# Patient Record
Sex: FEMALE | Race: BLACK OR AFRICAN AMERICAN | NOT HISPANIC OR LATINO | Employment: STUDENT | URBAN - METROPOLITAN AREA
[De-identification: names, ages, dates, MRNs, and addresses within clinical notes are randomized per-mention and may not be internally consistent; named-entity substitution may affect disease eponyms.]

---

## 2018-04-03 ENCOUNTER — HOSPITAL ENCOUNTER (EMERGENCY)
Facility: OTHER | Age: 43
Discharge: HOME | End: 2018-04-03
Attending: EMERGENCY MEDICINE
Payer: MEDICAID

## 2018-04-03 VITALS
HEIGHT: 64 IN | DIASTOLIC BLOOD PRESSURE: 92 MMHG | OXYGEN SATURATION: 96 % | BODY MASS INDEX: 42.46 KG/M2 | SYSTOLIC BLOOD PRESSURE: 138 MMHG | WEIGHT: 248.69 LBS | HEART RATE: 87 BPM | TEMPERATURE: 98 F | RESPIRATION RATE: 18 BRPM
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
## 2018-04-03 DIAGNOSIS — M79.605 PAIN OF LEFT LOWER EXTREMITY: ICD-10-CM

## 2018-04-03 DIAGNOSIS — S39.012A BACK STRAIN, INITIAL ENCOUNTER: Primary | ICD-10-CM

## 2018-04-03 PROCEDURE — 96372 THER/PROPH/DIAG INJ SC/IM: CPT

## 2018-04-03 PROCEDURE — 63600175 PHARM REV CODE 636 W HCPCS: Performed by: PHYSICIAN ASSISTANT

## 2018-04-03 PROCEDURE — 99283 EMERGENCY DEPT VISIT LOW MDM: CPT | Mod: 25

## 2018-04-03 RX ORDER — IBUPROFEN 800 MG/1
800 TABLET ORAL EVERY 6 HOURS PRN
Qty: 20 TABLET | Refills: 0 | OUTPATIENT
Start: 2018-04-03 | End: 2019-10-08

## 2018-04-03 RX ORDER — KETOROLAC TROMETHAMINE 30 MG/ML
30 INJECTION, SOLUTION INTRAMUSCULAR; INTRAVENOUS
Status: COMPLETED | OUTPATIENT
Start: 2018-04-03 | End: 2018-04-03

## 2018-04-03 RX ORDER — CARVEDILOL 25 MG/1
25 TABLET ORAL 2 TIMES DAILY WITH MEALS
COMMUNITY

## 2018-04-03 RX ADMIN — KETOROLAC TROMETHAMINE 30 MG: 30 INJECTION, SOLUTION INTRAMUSCULAR; INTRAVENOUS at 09:04
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
## 2019-10-08 ENCOUNTER — HOSPITAL ENCOUNTER (EMERGENCY)
Facility: OTHER | Age: 44
Discharge: HOME | End: 2019-10-08
Attending: EMERGENCY MEDICINE
Payer: MEDICAID

## 2019-10-08 VITALS
TEMPERATURE: 99 F | BODY MASS INDEX: 36.96 KG/M2 | HEART RATE: 87 BPM | DIASTOLIC BLOOD PRESSURE: 104 MMHG | SYSTOLIC BLOOD PRESSURE: 158 MMHG | WEIGHT: 230 LBS | OXYGEN SATURATION: 100 % | HEIGHT: 66 IN | RESPIRATION RATE: 25 BRPM

## 2019-10-08 DIAGNOSIS — R10.13 EPIGASTRIC ABDOMINAL PAIN: Primary | ICD-10-CM

## 2019-10-08 LAB
ALBUMIN SERPL BCP-MCNC: 3.5 G/DL (ref 3.5–5.2)
ALP SERPL-CCNC: 119 U/L (ref 55–135)
ALT SERPL W/O P-5'-P-CCNC: 17 U/L (ref 10–44)
ANION GAP SERPL CALC-SCNC: 14 MMOL/L (ref 8–16)
AST SERPL-CCNC: 17 U/L (ref 10–40)
BASOPHILS # BLD AUTO: 0.04 K/UL (ref 0–0.2)
BASOPHILS NFR BLD: 0.6 % (ref 0–1.9)
BILIRUB SERPL-MCNC: 0.4 MG/DL (ref 0.1–1)
BUN SERPL-MCNC: 10 MG/DL (ref 6–20)
CALCIUM SERPL-MCNC: 9.6 MG/DL (ref 8.7–10.5)
CHLORIDE SERPL-SCNC: 104 MMOL/L (ref 95–110)
CO2 SERPL-SCNC: 22 MMOL/L (ref 23–29)
CREAT SERPL-MCNC: 0.8 MG/DL (ref 0.5–1.4)
DIFFERENTIAL METHOD: NORMAL
EOSINOPHIL # BLD AUTO: 0.1 K/UL (ref 0–0.5)
EOSINOPHIL NFR BLD: 0.9 % (ref 0–8)
ERYTHROCYTE [DISTWIDTH] IN BLOOD BY AUTOMATED COUNT: 13.5 % (ref 11.5–14.5)
EST. GFR  (AFRICAN AMERICAN): >60 ML/MIN/1.73 M^2
EST. GFR  (NON AFRICAN AMERICAN): >60 ML/MIN/1.73 M^2
GLUCOSE SERPL-MCNC: 121 MG/DL (ref 70–110)
HCT VFR BLD AUTO: 37.7 % (ref 37–48.5)
HGB BLD-MCNC: 12.1 G/DL (ref 12–16)
IMM GRANULOCYTES # BLD AUTO: 0.03 K/UL (ref 0–0.04)
IMM GRANULOCYTES NFR BLD AUTO: 0.5 % (ref 0–0.5)
LIPASE SERPL-CCNC: 8 U/L (ref 4–60)
LYMPHOCYTES # BLD AUTO: 1.6 K/UL (ref 1–4.8)
LYMPHOCYTES NFR BLD: 23.6 % (ref 18–48)
MCH RBC QN AUTO: 30 PG (ref 27–31)
MCHC RBC AUTO-ENTMCNC: 32.1 G/DL (ref 32–36)
MCV RBC AUTO: 93 FL (ref 82–98)
MONOCYTES # BLD AUTO: 0.4 K/UL (ref 0.3–1)
MONOCYTES NFR BLD: 5.9 % (ref 4–15)
NEUTROPHILS # BLD AUTO: 4.6 K/UL (ref 1.8–7.7)
NEUTROPHILS NFR BLD: 68.5 % (ref 38–73)
NRBC BLD-RTO: 0 /100 WBC
PLATELET # BLD AUTO: 242 K/UL (ref 150–350)
PMV BLD AUTO: 9.8 FL (ref 9.2–12.9)
POTASSIUM SERPL-SCNC: 3.4 MMOL/L (ref 3.5–5.1)
PROT SERPL-MCNC: 6.9 G/DL (ref 6–8.4)
RBC # BLD AUTO: 4.04 M/UL (ref 4–5.4)
SODIUM SERPL-SCNC: 140 MMOL/L (ref 136–145)
WBC # BLD AUTO: 6.64 K/UL (ref 3.9–12.7)

## 2019-10-08 PROCEDURE — 80053 COMPREHEN METABOLIC PANEL: CPT

## 2019-10-08 PROCEDURE — 96374 THER/PROPH/DIAG INJ IV PUSH: CPT

## 2019-10-08 PROCEDURE — 96375 TX/PRO/DX INJ NEW DRUG ADDON: CPT

## 2019-10-08 PROCEDURE — 85025 COMPLETE CBC W/AUTO DIFF WBC: CPT

## 2019-10-08 PROCEDURE — 36415 COLL VENOUS BLD VENIPUNCTURE: CPT

## 2019-10-08 PROCEDURE — 99284 EMERGENCY DEPT VISIT MOD MDM: CPT | Mod: 25

## 2019-10-08 PROCEDURE — 83690 ASSAY OF LIPASE: CPT

## 2019-10-08 PROCEDURE — 63600175 PHARM REV CODE 636 W HCPCS: Performed by: EMERGENCY MEDICINE

## 2019-10-08 RX ORDER — CARVEDILOL 25 MG/1
25 TABLET ORAL
COMMUNITY
Start: 2018-10-24

## 2019-10-08 RX ORDER — ONDANSETRON 4 MG/1
4 TABLET, ORALLY DISINTEGRATING ORAL EVERY 6 HOURS PRN
Qty: 20 TABLET | Refills: 0 | Status: SHIPPED | OUTPATIENT
Start: 2019-10-08

## 2019-10-08 RX ORDER — IBUPROFEN 800 MG/1
800 TABLET ORAL
COMMUNITY
Start: 2018-04-03 | End: 2020-11-28

## 2019-10-08 RX ORDER — ONDANSETRON 2 MG/ML
4 INJECTION INTRAMUSCULAR; INTRAVENOUS
Status: COMPLETED | OUTPATIENT
Start: 2019-10-08 | End: 2019-10-08

## 2019-10-08 RX ORDER — OMEPRAZOLE 20 MG/1
40 CAPSULE, DELAYED RELEASE ORAL DAILY
Qty: 60 CAPSULE | Refills: 0 | Status: SHIPPED | OUTPATIENT
Start: 2019-10-08 | End: 2020-10-07

## 2019-10-08 RX ORDER — TRAMADOL HYDROCHLORIDE 50 MG/1
50 TABLET ORAL
COMMUNITY
Start: 2018-10-24

## 2019-10-08 RX ORDER — CHLORTHALIDONE 25 MG/1
TABLET ORAL
COMMUNITY
Start: 2018-10-30

## 2019-10-08 RX ORDER — MORPHINE SULFATE 4 MG/ML
4 INJECTION, SOLUTION INTRAMUSCULAR; INTRAVENOUS
Status: COMPLETED | OUTPATIENT
Start: 2019-10-08 | End: 2019-10-08

## 2019-10-08 RX ORDER — CYCLOBENZAPRINE HCL 10 MG
10 TABLET ORAL
COMMUNITY

## 2019-10-08 RX ADMIN — MORPHINE SULFATE 4 MG: 4 INJECTION INTRAVENOUS at 10:10

## 2019-10-08 RX ADMIN — SODIUM CHLORIDE 1000 ML: 0.9 INJECTION, SOLUTION INTRAVENOUS at 10:10

## 2019-10-08 RX ADMIN — ONDANSETRON 4 MG: 2 INJECTION INTRAMUSCULAR; INTRAVENOUS at 10:10

## 2019-10-08 NOTE — ED NOTES
Two patient Identifiers for Katharina Lyon checked and correct    Pt presents with back pain that comes around to the low abdomen on left side and radiates down left leg x1wk.    LOC/Affect: Pt A&Ox4. Pt affect is calm but continuously moving as if trying to find a comfortable position.   Appearance: Pt in no acute distress at present time. Pt is clean and well groomed.  Skin: Skin warm, dry & intact. Color consistent with ethnicity. Mucous membranes moist. No breakdown or brusing noted.   Musculoskeletal: Patient ROM intact, MAEW, no obvious swelling or deformities noted.   Respiratory: Respirations spontaneous, even, and non-labored. Visible chest rise noted. Airway is open and patent. No accessory muscle use noted. Breath sounds CTAB.   Cardiac: All peripheral pulses present. No Bilateral lower extremity edema. Cap refill is <3 seconds. NSR on cardiac monitoring.  Neurologic: Pupils 3mm PERRL. Motor and sensation intact. Speech is clear and appropriate. Eyes open spontaneously, follows commands, facial expression symmetrical.  Abdomen: Abdomen soft, tender to LLQ and substernal upon palpation. No distention noted. ABS all quads.   : Pt reports no dysuria or hematuria.     Will continue to monitor

## 2019-10-08 NOTE — ED PROVIDER NOTES
"Encounter Date: 10/8/2019    SCRIBE #1 NOTE: I, Mane Vishal, am scribing for, and in the presence of, Dr. Garcia .       History     Chief Complaint   Patient presents with    Abdominal Pain     Pt c/o feeling gas in her abdomen, chest & back X 1.5 weeks. Pt states "I feel like my kidneys are swollen." Pt reports that she is out of her BP med X 2 weeks.    Back Pain     Time seen by provider: 10:02 AM    This is a 43 y.o. female who presents with complaint of abdominal pain that began approximately two weeks ago. The pain starts in her back and radiates/wraps around to her upper abdomen. She is also experiencing nausea. The patient is exacerbated with movement and deep breathing. She denies any change in her abdominal pain with eating or drinking. She denies fever, sore throat, chest pain, shortness of breath, vomiting, diarrhea, and dysuria.     The history is provided by the patient.     Review of patient's allergies indicates:  No Known Allergies  Past Medical History:   Diagnosis Date    Chronic back pain     Hypertension      Past Surgical History:   Procedure Laterality Date     SECTION      HYSTERECTOMY       No family history on file.  Social History     Tobacco Use    Smoking status: Current Every Day Smoker    Smokeless tobacco: Never Used   Substance Use Topics    Alcohol use: Yes     Comment: socially    Drug use: No     Types: Marijuana     Review of Systems   Constitutional: Negative for fever.   HENT: Negative for sore throat.    Respiratory: Negative for shortness of breath.    Cardiovascular: Negative for chest pain.   Gastrointestinal: Positive for abdominal pain (upper) and nausea.   Genitourinary: Negative for dysuria.   Musculoskeletal: Positive for back pain (that radiates to her upper abdomen).   Skin: Negative for rash.   Neurological: Negative for weakness.   Hematological: Does not bruise/bleed easily.       Physical Exam     Initial Vitals [10/08/19 0934]   BP Pulse " Resp Temp SpO2   (!) 204/118 99 18 98.6 °F (37 °C) 98 %      MAP       --         Physical Exam    Nursing note and vitals reviewed.  Constitutional: She appears well-developed and well-nourished. She is not diaphoretic. No distress.   HENT:   Head: Normocephalic and atraumatic.   Right Ear: External ear normal.   Left Ear: External ear normal.   Nose: Nose normal.   Eyes: Conjunctivae and EOM are normal. Pupils are equal, round, and reactive to light.   Neck: Normal range of motion. Neck supple. No tracheal deviation present. No JVD present.   Cardiovascular: Normal rate, regular rhythm, normal heart sounds and intact distal pulses. Exam reveals no gallop and no friction rub.    No murmur heard.  Pulmonary/Chest: Breath sounds normal. No respiratory distress. She has no wheezes. She has no rhonchi. She has no rales. She exhibits no tenderness.   Abdominal: Soft. Bowel sounds are normal. She exhibits no distension and no mass. There is tenderness (Mild) in the epigastric area. There is no rebound and no guarding.   Musculoskeletal: Normal range of motion. She exhibits no edema or tenderness.   Neurological: She is alert and oriented to person, place, and time. She has normal strength. She displays normal reflexes. No cranial nerve deficit or sensory deficit.   Skin: Skin is warm and dry. No rash noted. No erythema.   Psychiatric: She has a normal mood and affect. Her behavior is normal.         ED Course   Procedures  Labs Reviewed   COMPREHENSIVE METABOLIC PANEL - Abnormal; Notable for the following components:       Result Value    Potassium 3.4 (*)     CO2 22 (*)     Glucose 121 (*)     All other components within normal limits   CBC W/ AUTO DIFFERENTIAL   LIPASE          Imaging Results    None          Medical Decision Making:   History:   Old Medical Records: I decided to obtain old medical records.  Differential Diagnosis:   Ectopic pregnancy, threatened miscarriage, miscarriage, urinary tract infection,  Acute pyelonephritis, ovarian torsion, ovarian cyst rupture, PID, BV, TOA, , ureterolithiais, AAA rupture / dissection, diverticulitis, colitis, inflammatory bowel disease, gastroenteritis, gastritis, ulcer, cholecystitis, gallstones, pancreatitis, ileus, small bowel obstruction, appendicitis, constipation, intestinal gas pain, GERD, intestinal spasm,  intrabominal hemorrhage, intrabominal thrombus    Clinical Tests:   Lab Tests: Ordered and Reviewed  ED Management:  Patient is status post hysterectomy, so do not feel that this is a sign of an ectopic pregnancy.  She has no  symptoms, nor fever or CVA tenderness, therefore I do not believe this is UTI or acute pyelonephritis.  Her symptoms seem more consistent with an upper GI tract issue be a peptic ulcer disease, GERD or acute gastritis.  Educated the patient on dietary changes she needs to make. Patient improved with treatment in the emergency department and comfortable going home. Discussed reasons to return and importance of followup.  Patient understands that the emergency visit today is primarily to address immediate concerns and to rule out emergent cause of symptoms and that they may require further workup and evaluation as an outpatient. All questions addressed and patient given discharge instructions and followup information.               Scribe Attestation:   Scribe #1: I performed the above scribed service and the documentation accurately describes the services I performed. I attest to the accuracy of the note.    Attending Attestation:           Physician Attestation for Scribe:  Physician Attestation Statement for Scribe #1: I, Dr. Garcia, reviewed documentation, as scribed by Mane Rodgers  in my presence, and it is both accurate and complete.                 ED Course as of Oct 09 2001   Tue Oct 08, 2019   1121 Patient reports feeling better.  We discussed gastritis versus peptic ulcer disease in the need to follow up with GI.  I also  discussed with her the need to stop drinking alcohol and explained to her what withdrawal symptoms are with the caveat that if she begins to have withdrawal symptoms she needs to present to the emergency department immediately.    [MA]      ED Course User Index  [MA] Gustavo Garcia MD     Clinical Impression:     1. Epigastric abdominal pain                                 Gustavo Garcia MD  10/09/19 2003

## 2019-10-10 ENCOUNTER — HOSPITAL ENCOUNTER (EMERGENCY)
Facility: OTHER | Age: 44
Discharge: HOME | End: 2019-10-10
Attending: EMERGENCY MEDICINE
Payer: MEDICAID

## 2019-10-10 VITALS
DIASTOLIC BLOOD PRESSURE: 103 MMHG | RESPIRATION RATE: 18 BRPM | WEIGHT: 230 LBS | HEART RATE: 85 BPM | BODY MASS INDEX: 36.96 KG/M2 | SYSTOLIC BLOOD PRESSURE: 177 MMHG | HEIGHT: 66 IN | TEMPERATURE: 99 F | OXYGEN SATURATION: 97 %

## 2019-10-10 DIAGNOSIS — R03.0 ELEVATED BLOOD PRESSURE READING: Primary | ICD-10-CM

## 2019-10-10 DIAGNOSIS — K57.92 DIVERTICULITIS: ICD-10-CM

## 2019-10-10 LAB
ALBUMIN SERPL BCP-MCNC: 3.3 G/DL (ref 3.5–5.2)
ALP SERPL-CCNC: 96 U/L (ref 55–135)
ALT SERPL W/O P-5'-P-CCNC: 13 U/L (ref 10–44)
ANION GAP SERPL CALC-SCNC: 8 MMOL/L (ref 8–16)
AST SERPL-CCNC: 14 U/L (ref 10–40)
BASOPHILS # BLD AUTO: 0.04 K/UL (ref 0–0.2)
BASOPHILS NFR BLD: 0.7 % (ref 0–1.9)
BILIRUB SERPL-MCNC: 0.4 MG/DL (ref 0.1–1)
BUN SERPL-MCNC: 12 MG/DL (ref 6–20)
CALCIUM SERPL-MCNC: 9.1 MG/DL (ref 8.7–10.5)
CHLORIDE SERPL-SCNC: 106 MMOL/L (ref 95–110)
CO2 SERPL-SCNC: 27 MMOL/L (ref 23–29)
CREAT SERPL-MCNC: 0.8 MG/DL (ref 0.5–1.4)
DIFFERENTIAL METHOD: NORMAL
EOSINOPHIL # BLD AUTO: 0.1 K/UL (ref 0–0.5)
EOSINOPHIL NFR BLD: 1 % (ref 0–8)
ERYTHROCYTE [DISTWIDTH] IN BLOOD BY AUTOMATED COUNT: 13.6 % (ref 11.5–14.5)
EST. GFR  (AFRICAN AMERICAN): >60 ML/MIN/1.73 M^2
EST. GFR  (NON AFRICAN AMERICAN): >60 ML/MIN/1.73 M^2
GLUCOSE SERPL-MCNC: 89 MG/DL (ref 70–110)
HCT VFR BLD AUTO: 37.1 % (ref 37–48.5)
HGB BLD-MCNC: 12.1 G/DL (ref 12–16)
IMM GRANULOCYTES # BLD AUTO: 0.02 K/UL (ref 0–0.04)
IMM GRANULOCYTES NFR BLD AUTO: 0.3 % (ref 0–0.5)
LIPASE SERPL-CCNC: 10 U/L (ref 4–60)
LYMPHOCYTES # BLD AUTO: 1.8 K/UL (ref 1–4.8)
LYMPHOCYTES NFR BLD: 30.2 % (ref 18–48)
MCH RBC QN AUTO: 29.8 PG (ref 27–31)
MCHC RBC AUTO-ENTMCNC: 32.6 G/DL (ref 32–36)
MCV RBC AUTO: 91 FL (ref 82–98)
MONOCYTES # BLD AUTO: 0.5 K/UL (ref 0.3–1)
MONOCYTES NFR BLD: 8.3 % (ref 4–15)
NEUTROPHILS # BLD AUTO: 3.5 K/UL (ref 1.8–7.7)
NEUTROPHILS NFR BLD: 59.5 % (ref 38–73)
NRBC BLD-RTO: 0 /100 WBC
PLATELET # BLD AUTO: 225 K/UL (ref 150–350)
PMV BLD AUTO: 10.1 FL (ref 9.2–12.9)
POTASSIUM SERPL-SCNC: 3.9 MMOL/L (ref 3.5–5.1)
PROT SERPL-MCNC: 6.5 G/DL (ref 6–8.4)
RBC # BLD AUTO: 4.06 M/UL (ref 4–5.4)
SODIUM SERPL-SCNC: 141 MMOL/L (ref 136–145)
WBC # BLD AUTO: 5.93 K/UL (ref 3.9–12.7)

## 2019-10-10 PROCEDURE — 85025 COMPLETE CBC W/AUTO DIFF WBC: CPT

## 2019-10-10 PROCEDURE — 25000003 PHARM REV CODE 250: Performed by: PHYSICIAN ASSISTANT

## 2019-10-10 PROCEDURE — 96374 THER/PROPH/DIAG INJ IV PUSH: CPT

## 2019-10-10 PROCEDURE — 83690 ASSAY OF LIPASE: CPT

## 2019-10-10 PROCEDURE — 63600175 PHARM REV CODE 636 W HCPCS: Performed by: PHYSICIAN ASSISTANT

## 2019-10-10 PROCEDURE — 99285 EMERGENCY DEPT VISIT HI MDM: CPT | Mod: 25

## 2019-10-10 PROCEDURE — 96361 HYDRATE IV INFUSION ADD-ON: CPT

## 2019-10-10 PROCEDURE — 25500020 PHARM REV CODE 255: Performed by: EMERGENCY MEDICINE

## 2019-10-10 PROCEDURE — 80053 COMPREHEN METABOLIC PANEL: CPT

## 2019-10-10 RX ORDER — AMOXICILLIN AND CLAVULANATE POTASSIUM 562.5; 437.5; 62.5 MG/1; MG/1; MG/1
2 TABLET, FILM COATED, EXTENDED RELEASE ORAL EVERY 12 HOURS
Qty: 40 TABLET | Refills: 0 | Status: SHIPPED | OUTPATIENT
Start: 2019-10-10 | End: 2019-10-20

## 2019-10-10 RX ORDER — ONDANSETRON 2 MG/ML
4 INJECTION INTRAMUSCULAR; INTRAVENOUS
Status: COMPLETED | OUTPATIENT
Start: 2019-10-10 | End: 2019-10-10

## 2019-10-10 RX ORDER — DICYCLOMINE HYDROCHLORIDE 10 MG/1
20 CAPSULE ORAL
Status: COMPLETED | OUTPATIENT
Start: 2019-10-10 | End: 2019-10-10

## 2019-10-10 RX ORDER — SODIUM CHLORIDE 9 MG/ML
1000 INJECTION, SOLUTION INTRAVENOUS
Status: COMPLETED | OUTPATIENT
Start: 2019-10-10 | End: 2019-10-10

## 2019-10-10 RX ADMIN — IOHEXOL 100 ML: 350 INJECTION, SOLUTION INTRAVENOUS at 11:10

## 2019-10-10 RX ADMIN — SODIUM CHLORIDE 1000 ML: 0.9 INJECTION, SOLUTION INTRAVENOUS at 10:10

## 2019-10-10 RX ADMIN — ONDANSETRON 4 MG: 2 INJECTION INTRAMUSCULAR; INTRAVENOUS at 10:10

## 2019-10-10 RX ADMIN — DICYCLOMINE HYDROCHLORIDE 20 MG: 10 CAPSULE ORAL at 10:10

## 2019-10-10 NOTE — ED NOTES
Pt AAOx4 and appropriate at this time. Respirations even and unlabored. No acute distress noted. PA-C aware of bp of 177/103. No new orders. Discussed importance of establishing PCP so she can take oral BP medications. Pt verbalized understanding.

## 2019-10-10 NOTE — ED NOTES
Appearance: Pt awake, alert & oriented to person, place & time. Pt in no acute distress at present time. Pt is clean and well groomed with clothes appropriately fastened.   Skin: Skin warm, dry & intact. Color consistent with ethnicity. Mucous membranes moist. No breakdown or brusing noted.   Musculoskeletal: Patient moving all extremities well, no obvious swelling or deformities noted.   Respiratory: Respirations spontaneous, even, and non-labored. Visible chest rise noted. Airway is open and patent. No accessory muscle use noted.   Neurologic: Sensation is intact. Speech is clear and appropriate. Eyes open spontaneously, behavior appropriate to situation, follows commands,  purposeful motor response noted.   Cardiac:  No Bilateral lower extremity edema. Cap refill is <3 seconds. Pt denies active chest pains, SOB, dizziness, blurred vision, weakness or fatigue at this time.   Abdomen: Generalized abd pain, worse on the L side. Flank and lumbar back pain reporting.   : Pt reports no dysuria or hematuria.

## 2019-10-10 NOTE — ED NOTES
Pt c/o ABD pain and back pain x several days; was seen in ED recently for same cc; reporting pain has not gotten better. Pt hasn't taken BP medications for 2 months. Pt AAOx4 and appropriate at this time. Respirations even and unlabored. No acute distress noted.

## 2019-10-10 NOTE — ED PROVIDER NOTES
"Encounter Date: 10/10/2019       History     Chief Complaint   Patient presents with    Abdominal Pain     Pt c/o left sided abdominal pain radiating to the LLE & bilateral flank pain. Pt evaluated in the ED on Tuesday, stating "they told me I have an ulcer, but something ain't right with my body."Pt prescribed medications on Tuesday & has not picked them up from pharmacy.    Flank Pain    Emesis     Pt also c/o vomiting which started Tuesday, able to hold down water.     Patient is 43-year-old female who presents with complaints of left-sided lower abdominal pain that has been present for approximately 2 weeks prior to arrival.  She was seen here in this emergency department a few days ago and was diagnosed with likely gastritis versus peptic ulcer and was prescribed medications that she has not been able to get filled yet.  She reports that she is still feeling nauseous and still having left-sided abdominal pain but admits the pain is more lower today.  She reports feeling the need to have a bowel movement but denies any abnormal bowel movements including constipation or diarrhea.  Denies any rectal bleeding or blood in stool.  Denies any vaginal discharge.  She does report some dysuria.  She denies associated fever, chills, chest pain or shortness of breath. She is not taking her previously prescribed blood pressure medications because she has not yet established with a primary care provider.  She is currently unaccompanied in the ER.  Of note she reports that she drinks beer daily but has not had any in the last few days.  Denies any shakiness, agitation or vomiting.  She is adamant that she has never experience withdrawal symptoms and reports that she is able to stop drinking for weeks at a time without difficulty. She reports historically that she drinks 3 beers daily..        Review of patient's allergies indicates:  No Known Allergies  Past Medical History:   Diagnosis Date    Chronic back pain     " Hypertension      Past Surgical History:   Procedure Laterality Date     SECTION      HYSTERECTOMY       History reviewed. No pertinent family history.  Social History     Tobacco Use    Smoking status: Former Smoker     Types: Cigarettes    Smokeless tobacco: Never Used   Substance Use Topics    Alcohol use: Yes     Comment: every day; 2 beers    Drug use: No     Types: Marijuana     Review of Systems   Constitutional: Negative for fever.   HENT: Negative for sore throat.    Respiratory: Negative for shortness of breath.    Cardiovascular: Negative for chest pain.   Gastrointestinal: Positive for abdominal pain and nausea.   Genitourinary: Negative for dysuria.   Musculoskeletal: Negative for back pain.   Skin: Negative for rash.   Neurological: Negative for weakness.   Hematological: Does not bruise/bleed easily.       Physical Exam     Initial Vitals [10/10/19 0934]   BP Pulse Resp Temp SpO2   (!) 184/134 96 18 98.5 °F (36.9 °C) 100 %      MAP       --         Physical Exam    Nursing note and vitals reviewed.  Constitutional: She appears well-developed and well-nourished. She is not diaphoretic. No distress.   Healthy-appearing female in no acute distress but appears uncomfortable during abdominal exam.  She makes good eye contact, speaks in clear full sentences and ambulates with ease.    HENT:   Head: Normocephalic and atraumatic.   Eyes: Conjunctivae and EOM are normal. Pupils are equal, round, and reactive to light. Right eye exhibits no discharge. Left eye exhibits no discharge. No scleral icterus.   Neck: Normal range of motion.   Cardiovascular: Normal rate, regular rhythm, normal heart sounds and intact distal pulses. Exam reveals no gallop and no friction rub.    No murmur heard.  Pulmonary/Chest: Breath sounds normal. She has no wheezes. She has no rhonchi. She has no rales.   Abdominal: Bowel sounds are normal. She exhibits no mass. There is tenderness. There is guarding. There is no  rebound.   Musculoskeletal: Normal range of motion. She exhibits no edema or tenderness.   Lymphadenopathy:     She has no cervical adenopathy.   Neurological: She is alert and oriented to person, place, and time. She has normal strength. No sensory deficit. GCS score is 15. GCS eye subscore is 4. GCS verbal subscore is 5. GCS motor subscore is 6.   Skin: Skin is warm. Capillary refill takes less than 2 seconds. No rash and no abscess noted. No erythema.   Psychiatric: She has a normal mood and affect. Her behavior is normal. Thought content normal.         ED Course   Procedures  Labs Reviewed   CBC W/ AUTO DIFFERENTIAL   COMPREHENSIVE METABOLIC PANEL   LIPASE   URINALYSIS           Labs Reviewed   COMPREHENSIVE METABOLIC PANEL - Abnormal; Notable for the following components:       Result Value    Albumin 3.3 (*)     All other components within normal limits   CBC W/ AUTO DIFFERENTIAL   LIPASE     Imaging Results          CT Abdomen Pelvis With Contrast (Final result)  Result time 10/10/19 12:04:40    Final result by Jamshid Cortez MD (10/10/19 12:04:40)                 Impression:      1. Diverticulosis of the sigmoid colon with questionable early findings of acute diverticulitis as above.  2. No diverticular abscesses.  3. Status post hysterectomy and cholecystectomy.  4. CT scan of the abdomen and pelvis otherwise is unremarkable.      Electronically signed by: Jamshid Cortez MD  Date:    10/10/2019  Time:    12:04             Narrative:    EXAMINATION:  CT ABDOMEN PELVIS WITH CONTRAST    CLINICAL HISTORY:  LLQ pain, suspect diverticulitis;    TECHNIQUE:  Low dose axial images, sagittal and coronal reformations were obtained from the lung bases to the pubic symphysis following the IV administration of 100 mL of Omnipaque 350 .  No oral contrast was given.    COMPARISON:  None    FINDINGS:  Minimal ground-glass type airspace opacities noted in the medial basilar segment of the right lower lobe, likely inflammatory.  No pleural effusion.  No significant pulmonary nodules are noted. Heart size is within normal limits.    Uniform and normal enhancement of the liver.  No intrahepatic masses are noted.  There is no biliary ductal dilatation.  There are no filling defects in the portal vein or the SMV or the splenic veins.    The spleen, the pancreas and the adrenal glands demonstrate no masses.  Gallbladder is not visualized on this study.  Clinical correlation for cholecystectomy suggested.    There are symmetric bilateral nephrograms.  No renal masses or hydronephrosis or calculi is noted.  Along the course of the ureters no abnormal calcifications.  The visualized urinary bladder is within normal limits.    Uterus not visualized, likely from prior hysterectomy.  No abnormal adnexal masses.  There are few phleboliths in the pelvis.    There are a few scattered diverticula in the sigmoid colon without significant thickening of the wall of the bowel.  Questionable slight increase hazy density in the pericolonic fat (image 97 series 2 that could represent early changes of diverticulitis.  No definite signs for diverticular abscess or perforation.  In the rest of the colon also no inflammatory processes.  Appendix is visualized without definite signs for acute appendicitis.  There is no small bowel obstruction or perforation.  Within the mesentery no soft tissue masses to suggest mesenteric adenitis.    There is faint atherosclerotic changes of the abdominal aorta.  No aneurysmal dilatation.  There is no intra-abdominal lymphadenopathy.  There is no pelvic lymphadenopathy.  The ischiorectal fossa are symmetric and within normal limits.    There are spondylotic changes in the lumbar spine especially at the L5-S1 and L4-5 disc spaces.  No neoplastic processes of the lumbar spine or the pelvis.                                Medical Decision Making:   ED Management:  Urgent evaluation a 43-year-old female who presents with complaints of  left-sided lower abdominal pain most consistent with early diverticulitis.  She is afebrile, nontoxic appearing, hemodynamically stable. Physical exam outlined above and reveals left lower quadrant tenderness to deep palpation without acute peritoneal signs. Remainder of physical exam is unremarkable. She is noted to have elevated blood pressure reading as high as 184/134 at triage which improved to 170 7/103 at rest and afte symptom management.  Diagnostic labs are at baseline with improvement in potassium which was noted to be low a few days prior to this evaluation.  CT abdomen pelvis with contrast reveals signs of early diverticulitis which is likely culprit for patient's pain.  Will cover with Augmentin.  I did educate her that she still could likely be having gastritis versus GERD versus peptic ulcer issues and that she should continue to plan to take Prilosec and Zofran as previously prescribed.  She is encouraged to take antibiotic with bland diet on a full stomach a so that antibiotics do not cause worsening GI symptoms.  She is given follow-up for gastroenterologist and is educated on ED return precautions.  She verbalized understanding is amenable to plan.  She is stable for discharge.                      Clinical Impression:       ICD-10-CM ICD-9-CM   1. Elevated blood pressure reading R03.0 796.2   2. Diverticulitis K57.92 562.11                                Kenya Perez PA-C  10/10/19 [id number]

## 2020-09-25 ENCOUNTER — TELEPHONE (OUTPATIENT)
Dept: OBSTETRICS AND GYNECOLOGY | Facility: CLINIC | Age: 45
End: 2020-09-25

## 2020-09-25 NOTE — TELEPHONE ENCOUNTER
Portal request called pt to Rs appt no answer left vm for pt to call and rs appt pt stated she could not make appt today due to work.

## 2020-11-28 ENCOUNTER — HOSPITAL ENCOUNTER (EMERGENCY)
Facility: OTHER | Age: 45
Discharge: HOME | End: 2020-11-28
Attending: EMERGENCY MEDICINE
Payer: MEDICAID

## 2020-11-28 VITALS
DIASTOLIC BLOOD PRESSURE: 79 MMHG | SYSTOLIC BLOOD PRESSURE: 141 MMHG | OXYGEN SATURATION: 99 % | HEIGHT: 66 IN | WEIGHT: 225 LBS | TEMPERATURE: 99 F | RESPIRATION RATE: 17 BRPM | HEART RATE: 82 BPM | BODY MASS INDEX: 36.16 KG/M2

## 2020-11-28 DIAGNOSIS — R10.31 RIGHT LOWER QUADRANT ABDOMINAL PAIN: Primary | ICD-10-CM

## 2020-11-28 LAB
ALBUMIN SERPL BCP-MCNC: 3.7 G/DL (ref 3.5–5.2)
ALP SERPL-CCNC: 103 U/L (ref 55–135)
ALT SERPL W/O P-5'-P-CCNC: 13 U/L (ref 10–44)
ANION GAP SERPL CALC-SCNC: 9 MMOL/L (ref 8–16)
AST SERPL-CCNC: 16 U/L (ref 10–40)
BASOPHILS # BLD AUTO: 0.03 K/UL (ref 0–0.2)
BASOPHILS NFR BLD: 0.4 % (ref 0–1.9)
BILIRUB SERPL-MCNC: 0.4 MG/DL (ref 0.1–1)
BILIRUB UR QL STRIP: NEGATIVE
BUN SERPL-MCNC: 11 MG/DL (ref 6–20)
CALCIUM SERPL-MCNC: 9.2 MG/DL (ref 8.7–10.5)
CHLORIDE SERPL-SCNC: 105 MMOL/L (ref 95–110)
CLARITY UR: CLEAR
CO2 SERPL-SCNC: 25 MMOL/L (ref 23–29)
COLOR UR: YELLOW
CREAT SERPL-MCNC: 0.8 MG/DL (ref 0.5–1.4)
DIFFERENTIAL METHOD: ABNORMAL
EOSINOPHIL # BLD AUTO: 0.1 K/UL (ref 0–0.5)
EOSINOPHIL NFR BLD: 1.1 % (ref 0–8)
ERYTHROCYTE [DISTWIDTH] IN BLOOD BY AUTOMATED COUNT: 13.7 % (ref 11.5–14.5)
EST. GFR  (AFRICAN AMERICAN): >60 ML/MIN/1.73 M^2
EST. GFR  (NON AFRICAN AMERICAN): >60 ML/MIN/1.73 M^2
GLUCOSE SERPL-MCNC: 80 MG/DL (ref 70–110)
GLUCOSE UR QL STRIP: NEGATIVE
HCT VFR BLD AUTO: 36.1 % (ref 37–48.5)
HCV AB SERPL QL IA: NEGATIVE
HGB BLD-MCNC: 11.6 G/DL (ref 12–16)
HGB UR QL STRIP: NEGATIVE
HIV 1+2 AB+HIV1 P24 AG SERPL QL IA: NEGATIVE
IMM GRANULOCYTES # BLD AUTO: 0.02 K/UL (ref 0–0.04)
IMM GRANULOCYTES NFR BLD AUTO: 0.3 % (ref 0–0.5)
KETONES UR QL STRIP: ABNORMAL
LEUKOCYTE ESTERASE UR QL STRIP: NEGATIVE
LYMPHOCYTES # BLD AUTO: 2.2 K/UL (ref 1–4.8)
LYMPHOCYTES NFR BLD: 30.1 % (ref 18–48)
MCH RBC QN AUTO: 29.7 PG (ref 27–31)
MCHC RBC AUTO-ENTMCNC: 32.1 G/DL (ref 32–36)
MCV RBC AUTO: 93 FL (ref 82–98)
MONOCYTES # BLD AUTO: 0.5 K/UL (ref 0.3–1)
MONOCYTES NFR BLD: 6.4 % (ref 4–15)
NEUTROPHILS # BLD AUTO: 4.6 K/UL (ref 1.8–7.7)
NEUTROPHILS NFR BLD: 61.7 % (ref 38–73)
NITRITE UR QL STRIP: NEGATIVE
NRBC BLD-RTO: 0 /100 WBC
PH UR STRIP: 6 [PH] (ref 5–8)
PLATELET # BLD AUTO: 229 K/UL (ref 150–350)
PMV BLD AUTO: 10.1 FL (ref 9.2–12.9)
POTASSIUM SERPL-SCNC: 3.8 MMOL/L (ref 3.5–5.1)
PROT SERPL-MCNC: 7 G/DL (ref 6–8.4)
PROT UR QL STRIP: NEGATIVE
RBC # BLD AUTO: 3.9 M/UL (ref 4–5.4)
SODIUM SERPL-SCNC: 139 MMOL/L (ref 136–145)
SP GR UR STRIP: 1.02 (ref 1–1.03)
URN SPEC COLLECT METH UR: ABNORMAL
UROBILINOGEN UR STRIP-ACNC: NEGATIVE EU/DL
WBC # BLD AUTO: 7.38 K/UL (ref 3.9–12.7)

## 2020-11-28 PROCEDURE — 99285 EMERGENCY DEPT VISIT HI MDM: CPT | Mod: 25

## 2020-11-28 PROCEDURE — 81003 URINALYSIS AUTO W/O SCOPE: CPT

## 2020-11-28 PROCEDURE — 80053 COMPREHEN METABOLIC PANEL: CPT

## 2020-11-28 PROCEDURE — 25000003 PHARM REV CODE 250: Performed by: EMERGENCY MEDICINE

## 2020-11-28 PROCEDURE — 96361 HYDRATE IV INFUSION ADD-ON: CPT

## 2020-11-28 PROCEDURE — 86703 HIV-1/HIV-2 1 RESULT ANTBDY: CPT

## 2020-11-28 PROCEDURE — 86803 HEPATITIS C AB TEST: CPT

## 2020-11-28 PROCEDURE — 63600175 PHARM REV CODE 636 W HCPCS: Performed by: EMERGENCY MEDICINE

## 2020-11-28 PROCEDURE — 96374 THER/PROPH/DIAG INJ IV PUSH: CPT

## 2020-11-28 PROCEDURE — 25500020 PHARM REV CODE 255: Performed by: EMERGENCY MEDICINE

## 2020-11-28 PROCEDURE — 85025 COMPLETE CBC W/AUTO DIFF WBC: CPT

## 2020-11-28 RX ORDER — HYOSCYAMINE SULFATE 0.125 MG
125 TABLET ORAL EVERY 4 HOURS PRN
Qty: 12 TABLET | Refills: 0 | Status: SHIPPED | OUTPATIENT
Start: 2020-11-28 | End: 2020-12-28

## 2020-11-28 RX ORDER — HYDROMORPHONE HYDROCHLORIDE 1 MG/ML
0.5 INJECTION, SOLUTION INTRAMUSCULAR; INTRAVENOUS; SUBCUTANEOUS
Status: COMPLETED | OUTPATIENT
Start: 2020-11-28 | End: 2020-11-28

## 2020-11-28 RX ORDER — IBUPROFEN 600 MG/1
600 TABLET ORAL EVERY 6 HOURS PRN
Qty: 20 TABLET | Refills: 0 | OUTPATIENT
Start: 2020-11-28 | End: 2023-01-19

## 2020-11-28 RX ADMIN — SODIUM CHLORIDE 1000 ML: 0.9 INJECTION, SOLUTION INTRAVENOUS at 11:11

## 2020-11-28 RX ADMIN — HYDROMORPHONE HYDROCHLORIDE 0.5 MG: 1 INJECTION, SOLUTION INTRAMUSCULAR; INTRAVENOUS; SUBCUTANEOUS at 11:11

## 2020-11-28 RX ADMIN — IOHEXOL 100 ML: 350 INJECTION, SOLUTION INTRAVENOUS at 12:11

## 2020-11-28 NOTE — ED NOTES
Pt to ED with c/o intermittent RLQ pain x 1 month. Pt reports pain worse last PM. Pt reports pain radiating to R side of lower back and down R leg. Pt reports pain with urination. Pt denies vaginal discharge or bleeding. Pt denies blood in urine. Pt reports nausea x last PM. Pt denies CP, SOB, v/d, fever, chills. AAOX4. RR even, unlabored. Pt attached to pulse ox and BP cycled. Will continue to monitor.

## 2020-11-28 NOTE — ED NOTES
Patient rounding complete. Comfort and positioning addressed. Toileting needs addressed. Pain (8/10). Pt remains attached to pulse ox, cardiac monitor, BP cycled. Bed rails up x2, bed locked and at lowest position, call remains at beside within reach of patient, instructed on use. AAOx4. RR even and unlabored. Pt updated on plan of care. Pt verbalized understanding. Will continue to monitor.

## 2020-11-28 NOTE — ED NOTES
Patient rounding complete. Comfort and positioning addressed. Toileting needs addressed. Pain (4/10). Pt remains attached to pulse ox, cardiac monitor, BP cycled. Bed rails up x2, bed locked and at lowest position, call remains at beside within reach of patient, instructed on use. AAOx4. RR even and unlabored. Pt updated on plan of care. Pt verbalized understanding. Will continue to monitor.

## 2020-11-28 NOTE — ED PROVIDER NOTES
"Encounter Date: 2020    SCRIBE #1 NOTE: I, Carolyn Brown, am scribing for, and in the presence of, Dr. Clemons.       History     Chief Complaint   Patient presents with    Abdominal Pain     +Intermittent RLQ pain with radiation into R back and dysuria x1 month, worsened last PM with nausea. Denies fever, chills, hematuria.       Time seen by provider: 11:10 AM    This is a 44 y.o. female with hx of HTN who presents with complaint of abdominal pain. Patient reports that she has had chronic abdominal pain for the last 24 years since having a , which has become more intense and frequent over the last month and especially worse yesterday. The pain was rated 10/10 last night, but is now rated 8/10. She describes the pain as "cramping," which radiates from the RLQ to her right groin. The pain worsens when her bladder is full and when urinating. There are no alleviating factors. The pain is unchanged with eating meals, drinking, or bowel movements. She reports chills, nausea, and urinary frequency. She denies fever, vomiting, diarrhea, constipation, dysuria, or hematuria. PSHx includes hysterectomy and  section. She has been told in the past that her chronic pain may be due to scar tissue. NKDA. She reports use of tobacco and alcohol. This is the extent of the patient's complaints at this time.    The history is provided by the patient.     Review of patient's allergies indicates:  No Known Allergies  Past Medical History:   Diagnosis Date    Chronic back pain     Hypertension      Past Surgical History:   Procedure Laterality Date     SECTION      HYSTERECTOMY       History reviewed. No pertinent family history.  Social History     Tobacco Use    Smoking status: Former Smoker     Types: Cigarettes    Smokeless tobacco: Never Used   Substance Use Topics    Alcohol use: Yes     Comment: every day; 2 beers    Drug use: No     Types: Marijuana     Review of Systems   Constitutional: " Positive for chills. Negative for fever.   HENT: Negative for congestion, rhinorrhea and sore throat.    Eyes: Negative for visual disturbance.   Respiratory: Negative for cough and shortness of breath.    Cardiovascular: Negative for chest pain and palpitations.   Gastrointestinal: Positive for abdominal pain and nausea. Negative for constipation, diarrhea and vomiting.   Genitourinary: Positive for frequency. Negative for decreased urine volume, dysuria, hematuria and vaginal discharge.   Musculoskeletal: Negative for joint swelling, neck pain and neck stiffness.   Skin: Negative for rash and wound.   Neurological: Negative for weakness, numbness and headaches.   Psychiatric/Behavioral: Negative for confusion.       Physical Exam     Initial Vitals [11/28/20 0944]   BP Pulse Resp Temp SpO2   (!) 158/97 103 18 98.8 °F (37.1 °C) 100 %      MAP       --         Physical Exam    Nursing note and vitals reviewed.  Constitutional: She appears well-developed and well-nourished. She is not diaphoretic. No distress.   HENT:   Head: Normocephalic and atraumatic.   Eyes: Conjunctivae and EOM are normal. Pupils are equal, round, and reactive to light. No scleral icterus.   Neck: Normal range of motion. Neck supple.   Cardiovascular: Normal rate, regular rhythm and normal heart sounds. Exam reveals no gallop and no friction rub.    No murmur heard.  Pulmonary/Chest: Breath sounds normal. No respiratory distress. She has no wheezes. She has no rhonchi. She has no rales.   Abdominal: Soft. Bowel sounds are normal. She exhibits no distension. There is abdominal tenderness in the right lower quadrant, suprapubic area and left lower quadrant. There is guarding. There is no rebound.   Musculoskeletal: Normal range of motion. No tenderness or edema.   Neurological: She is alert and oriented to person, place, and time.   Skin: Skin is warm and dry.   Psychiatric: She has a normal mood and affect. Her behavior is normal. Judgment and  thought content normal.         ED Course   Procedures  Labs Reviewed   URINALYSIS, REFLEX TO URINE CULTURE - Abnormal; Notable for the following components:       Result Value    Ketones, UA Trace (*)     All other components within normal limits    Narrative:     Specimen Source->Urine   CBC W/ AUTO DIFFERENTIAL - Abnormal; Notable for the following components:    RBC 3.90 (*)     Hemoglobin 11.6 (*)     Hematocrit 36.1 (*)     All other components within normal limits   HIV 1 / 2 ANTIBODY   HEPATITIS C ANTIBODY   COMPREHENSIVE METABOLIC PANEL          Imaging Results          CT Abdomen Pelvis With Contrast / no oral contrast (Final result)  Result time 11/28/20 12:58:04    Final result by Christian Galvez MD (11/28/20 12:58:04)                 Impression:      No CT evidence of appendicitis or concerning inflammatory process.      Electronically signed by: Christian Galvez MD  Date:    11/28/2020  Time:    12:58             Narrative:    EXAMINATION:  CT ABDOMEN PELVIS WITH CONTRAST    CLINICAL HISTORY:  RLQ abdominal pain, appendicitis suspected (Age => 12y);    TECHNIQUE:  Low dose axial images, sagittal and coronal reformations were obtained from the lung bases to the pubic symphysis following the IV administration of 100 mL of Omnipaque 350 .  Oral contrast was not given.    COMPARISON:  CT 10/10/2019    FINDINGS:  Visualized lower chest is unremarkable.    Within the abdomen, the liver and spleen are unchanged.  Pancreas is normal. Adrenal glands are unremarkable. Gallbladder normal. No biliary dilatation.    Kidneys are unremarkable.    Stomach is normal. Small bowel is nonobstructive. Colon demonstrates no acute abnormality.  No CT evidence of appendicitis.    No free fluid or adenopathy present.    Within the pelvis, urinary bladder is unremarkable. No pelvic mass or adenopathy present. No free fluid.  Hysterectomy findings.    Osseous structures demonstrate no acute abnormality with degenerative  findings.                                 Medical Decision Making:   History:   Old Medical Records: I decided to obtain old medical records.  Clinical Tests:   Lab Tests: Ordered and Reviewed  Radiological Study: Ordered and Reviewed  ED Management:  Emergent evaluation of 44-year-old female who presents with complaint of acute on chronic lower abdominal pain.  Patient reports history of chronic pain for decades, which is been worse for the last few days with some associated urinary frequency.  Vital signs are benign, afebrile.  On examination she is tender in bilateral lower quadrant with guarding.  CT abdomen pelvis performed as I was concerned for possible obstruction, colitis, appendicitis- it is normal with no concerning findings or significant inflammation.  Urinalysis is also clear with no evidence of UTI and no hematuria to suggest renal colic.  Patient was treated with analgesics and IV fluids with improvement.  On reassessment, pain had improved she was no longer tender to palpation and she was in agreement with discharge home.  She stated she was feeling much better and will follow-up with her PCP.  She is also given strict return precautions.  She was discharged in improved condition.            Scribe Attestation:   Scribe #1: I performed the above scribed service and the documentation accurately describes the services I performed. I attest to the accuracy of the note.    Attending Attestation:           Physician Attestation for Scribe:  Physician Attestation Statement for Scribe #1: I, Dr. Clemons, reviewed documentation, as scribed by Carolyn Brown in my presence, and it is both accurate and complete.                           Clinical Impression:     ICD-10-CM ICD-9-CM   1. Right lower quadrant abdominal pain  R10.31 789.03                          ED Disposition Condition    Discharge Stable        ED Prescriptions     Medication Sig Dispense Start Date End Date Auth. Provider    ibuprofen  (ADVIL,MOTRIN) 600 MG tablet Take 1 tablet (600 mg total) by mouth every 6 (six) hours as needed for Pain. 20 tablet 11/28/2020  Rose Clemons MD    hyoscyamine (ANASPAZ,LEVSIN) 0.125 mg Tab Take 1 tablet (125 mcg total) by mouth every 4 (four) hours as needed (cramping). 12 tablet 11/28/2020 12/28/2020 Rose Clemons MD        Follow-up Information     Follow up With Specialties Details Why Contact Info    [facility] Schedule an appointment as soon as possible for a visit   [address] [address]  [phone]      [facility]  As needed, If symptoms worsen [address] [facility] [id number] [phone]                                       Rose Clemons MD  11/28/20 [id number]

## 2020-11-28 NOTE — ED NOTES
Patient rounding complete. Comfort and positioning addressed. Toileting needs addressed. Pain (3/10). Pt remains attached to pulse ox, cardiac monitor, BP cycled. Bed rails up x2, bed locked and at lowest position, call remains at beside within reach of patient, instructed on use. AAOx4. RR even and unlabored. Pt updated on plan of care. Pt verbalized understanding. Will continue to monitor.

## 2021-03-12 ENCOUNTER — HOSPITAL ENCOUNTER (EMERGENCY)
Facility: HOSPITAL | Age: 46
Discharge: HOME | End: 2021-03-12
Attending: EMERGENCY MEDICINE
Payer: MEDICAID

## 2021-03-12 VITALS
BODY MASS INDEX: 36.16 KG/M2 | WEIGHT: 225 LBS | SYSTOLIC BLOOD PRESSURE: 161 MMHG | DIASTOLIC BLOOD PRESSURE: 114 MMHG | HEIGHT: 66 IN | HEART RATE: 83 BPM | OXYGEN SATURATION: 99 % | TEMPERATURE: 99 F | RESPIRATION RATE: 18 BRPM

## 2021-03-12 DIAGNOSIS — S63.92XA HAND SPRAIN, LEFT, INITIAL ENCOUNTER: ICD-10-CM

## 2021-03-12 DIAGNOSIS — T14.90XA BLUNT TRAUMA: ICD-10-CM

## 2021-03-12 DIAGNOSIS — S63.502A FOREARM SPRAIN, LEFT, INITIAL ENCOUNTER: ICD-10-CM
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
## 2021-03-12 DIAGNOSIS — I10 UNCONTROLLED HYPERTENSION: ICD-10-CM

## 2021-03-12 PROCEDURE — 99284 EMERGENCY DEPT VISIT MOD MDM: CPT | Mod: 25,ER

## 2021-03-12 PROCEDURE — 25000003 PHARM REV CODE 250: Mod: ER | Performed by: NURSE PRACTITIONER

## 2021-03-12 RX ORDER — CLONIDINE HYDROCHLORIDE 0.1 MG/1
0.1 TABLET ORAL
Status: COMPLETED | OUTPATIENT
Start: 2021-03-12 | End: 2021-03-12

## 2021-03-12 RX ORDER — DICLOFENAC SODIUM 50 MG/1
50 TABLET, DELAYED RELEASE ORAL 3 TIMES DAILY PRN
Qty: 30 TABLET | Refills: 0 | Status: SHIPPED | OUTPATIENT
Start: 2021-03-12

## 2021-03-12 RX ORDER — KETOROLAC TROMETHAMINE 10 MG/1
10 TABLET, FILM COATED ORAL
Status: COMPLETED | OUTPATIENT
Start: 2021-03-12 | End: 2021-03-12

## 2021-03-12 RX ORDER — METHOCARBAMOL 500 MG/1
1000 TABLET, FILM COATED ORAL EVERY 6 HOURS PRN
Qty: 40 TABLET | Refills: 0 | OUTPATIENT
Start: 2021-03-12 | End: 2023-01-19

## 2021-03-12 RX ADMIN — CLONIDINE HYDROCHLORIDE 0.1 MG: 0.1 TABLET ORAL at 11:03

## 2021-03-12 RX ADMIN — KETOROLAC TROMETHAMINE 10 MG: 10 TABLET, FILM COATED ORAL at 11:03

## 2021-03-30 ENCOUNTER — IMMUNIZATION (OUTPATIENT)
Dept: PRIMARY CARE CLINIC | Facility: CLINIC | Age: 46
End: 2021-03-30
Payer: MEDICAID

## 2021-03-30 DIAGNOSIS — Z23 NEED FOR VACCINATION: Primary | ICD-10-CM

## 2021-03-30 PROCEDURE — 0011A PR IMMUNIZ ADMIN, SARS-COV-2 COVID-19 VACC, 100MCG/0.5ML, 1ST DOSE: ICD-10-PCS | Mod: CV19,S$GLB,, | Performed by: INTERNAL MEDICINE

## 2021-03-30 PROCEDURE — 91301 PR SARS-COV-2 COVID-19 VACCINE, NO PRSV, 100MCG/0.5ML, IM: ICD-10-PCS | Mod: S$GLB,,, | Performed by: INTERNAL MEDICINE

## 2021-03-30 PROCEDURE — 91301 PR SARS-COV-2 COVID-19 VACCINE, NO PRSV, 100MCG/0.5ML, IM: CPT | Mod: S$GLB,,, | Performed by: INTERNAL MEDICINE

## 2021-03-30 PROCEDURE — 0011A PR IMMUNIZ ADMIN, SARS-COV-2 COVID-19 VACC, 100MCG/0.5ML, 1ST DOSE: CPT | Mod: CV19,S$GLB,, | Performed by: INTERNAL MEDICINE

## 2021-03-30 RX ADMIN — Medication 0.5 ML: at 10:03

## 2021-04-28 ENCOUNTER — IMMUNIZATION (OUTPATIENT)
Dept: PRIMARY CARE CLINIC | Facility: CLINIC | Age: 46
End: 2021-04-28
Payer: MEDICAID

## 2021-04-28 DIAGNOSIS — Z23 NEED FOR VACCINATION: Primary | ICD-10-CM

## 2021-04-28 PROCEDURE — 91301 PR SARS-COV-2 COVID-19 VACCINE, NO PRSV, 100MCG/0.5ML, IM: ICD-10-PCS | Mod: S$GLB,,, | Performed by: INTERNAL MEDICINE

## 2021-04-28 PROCEDURE — 0012A PR IMMUNIZ ADMIN, SARS-COV-2 COVID-19 VACC, 100MCG/0.5ML, 2ND DOSE: CPT | Mod: CV19,S$GLB,, | Performed by: INTERNAL MEDICINE

## 2021-04-28 PROCEDURE — 0012A PR IMMUNIZ ADMIN, SARS-COV-2 COVID-19 VACC, 100MCG/0.5ML, 2ND DOSE: ICD-10-PCS | Mod: CV19,S$GLB,, | Performed by: INTERNAL MEDICINE

## 2021-04-28 PROCEDURE — 91301 PR SARS-COV-2 COVID-19 VACCINE, NO PRSV, 100MCG/0.5ML, IM: CPT | Mod: S$GLB,,, | Performed by: INTERNAL MEDICINE

## 2021-04-28 RX ADMIN — Medication 0.5 ML: at 09:04

## 2023-01-19 ENCOUNTER — HOSPITAL ENCOUNTER (EMERGENCY)
Facility: OTHER | Age: 48
Discharge: HOME | End: 2023-01-19
Attending: EMERGENCY MEDICINE
Payer: MEDICAID

## 2023-01-19 VITALS
RESPIRATION RATE: 18 BRPM | SYSTOLIC BLOOD PRESSURE: 153 MMHG | WEIGHT: 230 LBS | TEMPERATURE: 98 F | HEIGHT: 65 IN | OXYGEN SATURATION: 98 % | HEART RATE: 76 BPM | BODY MASS INDEX: 38.32 KG/M2 | DIASTOLIC BLOOD PRESSURE: 101 MMHG

## 2023-01-19 DIAGNOSIS — M54.42 CHRONIC BILATERAL LOW BACK PAIN WITH LEFT-SIDED SCIATICA: Primary | ICD-10-CM

## 2023-01-19 DIAGNOSIS — M25.562 LEFT KNEE PAIN: ICD-10-CM

## 2023-01-19 DIAGNOSIS — M17.12 OSTEOARTHRITIS OF LEFT KNEE, UNSPECIFIED OSTEOARTHRITIS TYPE: ICD-10-CM

## 2023-01-19 DIAGNOSIS — G89.29 CHRONIC BILATERAL LOW BACK PAIN WITH LEFT-SIDED SCIATICA: Primary | ICD-10-CM

## 2023-01-19 PROCEDURE — 63600175 PHARM REV CODE 636 W HCPCS: Performed by: NURSE PRACTITIONER

## 2023-01-19 PROCEDURE — 96372 THER/PROPH/DIAG INJ SC/IM: CPT | Performed by: NURSE PRACTITIONER

## 2023-01-19 PROCEDURE — 25000003 PHARM REV CODE 250: Performed by: NURSE PRACTITIONER

## 2023-01-19 PROCEDURE — 99284 EMERGENCY DEPT VISIT MOD MDM: CPT | Mod: 25

## 2023-01-19 RX ORDER — HYDROCODONE BITARTRATE AND ACETAMINOPHEN 5; 325 MG/1; MG/1
1 TABLET ORAL
Status: DISCONTINUED | OUTPATIENT
Start: 2023-01-19 | End: 2023-01-19 | Stop reason: HOSPADM

## 2023-01-19 RX ORDER — METHOCARBAMOL 750 MG/1
1500 TABLET, FILM COATED ORAL 3 TIMES DAILY
Qty: 30 TABLET | Refills: 0 | Status: SHIPPED | OUTPATIENT
Start: 2023-01-19 | End: 2023-01-24

## 2023-01-19 RX ORDER — IBUPROFEN 600 MG/1
600 TABLET ORAL EVERY 6 HOURS PRN
Qty: 20 TABLET | Refills: 0 | Status: SHIPPED | OUTPATIENT
Start: 2023-01-19

## 2023-01-19 RX ORDER — LIDOCAINE 50 MG/G
1 PATCH TOPICAL ONCE
Status: DISCONTINUED | OUTPATIENT
Start: 2023-01-19 | End: 2023-01-19 | Stop reason: HOSPADM

## 2023-01-19 RX ORDER — LIDOCAINE 50 MG/G
PATCH CUTANEOUS DAILY
Qty: 15 PATCH | Refills: 0 | Status: SHIPPED | OUTPATIENT
Start: 2023-01-19

## 2023-01-19 RX ORDER — METHOCARBAMOL 750 MG/1
1500 TABLET, FILM COATED ORAL
Status: DISCONTINUED | OUTPATIENT
Start: 2023-01-19 | End: 2023-01-19 | Stop reason: HOSPADM

## 2023-01-19 RX ORDER — HYDROCODONE BITARTRATE AND ACETAMINOPHEN 5; 325 MG/1; MG/1
1 TABLET ORAL EVERY 6 HOURS PRN
Qty: 12 TABLET | Refills: 0 | Status: SHIPPED | OUTPATIENT
Start: 2023-01-19

## 2023-01-19 RX ORDER — KETOROLAC TROMETHAMINE 30 MG/ML
15 INJECTION, SOLUTION INTRAMUSCULAR; INTRAVENOUS
Status: COMPLETED | OUTPATIENT
Start: 2023-01-19 | End: 2023-01-19

## 2023-01-19 RX ADMIN — LIDOCAINE 1 PATCH: 50 PATCH CUTANEOUS at 11:01

## 2023-01-19 RX ADMIN — KETOROLAC TROMETHAMINE 15 MG: 30 INJECTION, SOLUTION INTRAMUSCULAR; INTRAVENOUS at 11:01

## 2023-01-19 NOTE — ED TRIAGE NOTES
"Pt reports to ED with left sided leg pain that starts from left buttocks since yesterday. Pt also reports right sided lower back pain. +Hx of sciatica. Denies numbness or tingling in foot. Pt has trouble ambulating and states she can feel her left knee "crunching" when she walks. Took tramadol and ibuprofen at home with no relief. Also reports increased swelling in left leg. Aaox4.   "

## 2023-01-19 NOTE — ED PROVIDER NOTES
"     Source of History:  Patient    Chief complaint:  Leg Pain (C/o left lower back pain 10/10 that radiates to LLE that began last night. -trauma/injury. PMH Sciatica and HTN. VSS)      HPI:  Katharina Lyon is a 47 y.o. female presenting with exacerbation of her chronic lower back pain that radiates down into her left lower leg consistent with sciatica.  She takes gabapentin and tramadol for her sciatic pain but states that he is not been working.  She is also reporting left knee pain that is interfering with her ability to walk.  She reports history of arthritis.  Denies injury or trauma.  She is not taken any medications for her symptoms today.  Denies perineal numbness, bowel or bladder incontinence, numbness and tingling of the affected extremity.      This is the extent to the patients complaints today here in the emergency department.    ROS: As per HPI and below:  General: No fever.  No chills.  Eyes: No visual changes.   ENT: No sore throat. No ear pain.  Urinary: No abnormal urination.  MSK:  Positive low back pain, positive left knee pain  Integument: No rashes or lesions.    Review of patient's allergies indicates:  No Known Allergies    PMH:  As per HPI and below:  Past Medical History:   Diagnosis Date    Chronic back pain     Hypertension      Past Surgical History:   Procedure Laterality Date     SECTION      HYSTERECTOMY         Social History     Tobacco Use    Smoking status: Former     Types: Cigarettes    Smokeless tobacco: Never   Substance Use Topics    Alcohol use: Yes     Comment: every day; 2 beers    Drug use: No     Types: Marijuana       Physical Exam:    BP (!) 153/101 (BP Location: Right arm, Patient Position: Sitting)   Pulse 76   Temp 98.1 °F (36.7 °C) (Oral)   Resp 18   Ht 5' 5" (1.651 m)   Wt 104.3 kg (230 lb)   SpO2 98%   BMI 38.27 kg/m²   Nursing note and vital signs reviewed.  Appearance: No acute distress.  Eyes: No conjunctival injection.  ENT: Normal " phonation.  Musculoskeletal:  No C/T/L midline tenderness.  Positive left straight leg raise test.  Mild tenderness palpation of bilateral lumbar paraspinal muscles.  Skin: No rashes seen.  Good turgor.  No abrasions.  No ecchymoses.  Mental Status:  Alert and oriented x 3.  Appropriate, conversant.        Initial Impression/ Differential Dx:  Urgent evaluation of 47-year-old female with known history of sciatica presenting with sciatic flare as well as left knee pain.  Patient is afebrile, appears uncomfortable but not toxic and hemodynamically stable.  On exam left knee equal in size to the right.  No overlying skin changes or warmth to suggest septic joint.  Patient with antalgic gait and pain elicited with range of motion however patient is able to flex and extend at the joint.  Likely musculoskeletal strain or flare of arthritis.  The patient's back pain is likely a exacerbation of her chronic sciatica.  There are no signs of saddle anesthesia, incontinence, neurologic deficits, fevers, trauma or midline tenderness on history or physical to suggest cauda equina, infectious process, fracture or subluxation.  Will treat with Toradol Robaxin and Lidoderm, obtain plain film of left knee continue to reassess.    Differential Diagnosis includes, but is not limited to:  Cauda equina syndrome, diskitis/osteomyelitis, epidural/paraspinal abscess, AAA, aortic dissection, post-op/hardware infection, trauma/vertebral fracture, spinal cord injury, disc herniation, spinal stenosis, sciatica, radiculopathy, neoplasm, lumbar muscle strain, muscle spasm, neuropathic pain, UTI/pyelonephritis, nephrolithiasis.  Differential Diagnosis includes, but is not limited to:  Fracture, dislocation, compartment syndrome, nerve injury/palsy, vascular injury, DVT, rhabdomyolysis, hemarthrosis, septic joint, cellulitis, bursitis, muscle strain, ligament tear/sprain, laceration, foreign body, abrasion, soft tissue contusion,  osteoarthritis.      MDM:        ED Course as of 01/19/23 1747   u Jan 19, 2023   1245 X-Ray Knee 3 View Left  Age advanced tricompartmental degenerative changes.  No acute displaced fracture. [CU]   1245 At bedside to reassess patient.  She reports improvement in her sciatic pain but is continuing to report discomfort in the left knee.  Will wrapped in Ace bandage, wrapped with Ace band for comfort and give crutches.  Will also treat with a short course of pain meds and ortho follow-up.  Patient is amenable to this plan and discharged home in good condition. Patient educated on signs and symptoms to monitor for and when to return to ED. Patient verbalized understanding agrees with treatment plan. All questions and concerns addressed.      [CU]      ED Course User Index  [CU] Fany Reddy NP               Diagnostic Impression:    1. Chronic bilateral low back pain with left-sided sciatica    2. Left knee pain    3. Osteoarthritis of left knee, unspecified osteoarthritis type         ED Disposition Condition    Discharge Good            ED Prescriptions       Medication Sig Dispense Start Date End Date Auth. Provider    ibuprofen (ADVIL,MOTRIN) 600 MG tablet Take 1 tablet (600 mg total) by mouth every 6 (six) hours as needed for Pain. 20 tablet 1/19/2023 -- Fany Reddy NP    methocarbamoL (ROBAXIN) 750 MG Tab Take 2 tablets (1,500 mg total) by mouth 3 (three) times daily. for 5 days 30 tablet 1/19/2023 1/24/2023 Fany Reddy NP    LIDOcaine (LIDODERM) 5 % Place onto the skin once daily. Remove & Discard patch within 12 hours or as directed by MD 15 patch 1/19/2023 -- Fany Reddy NP    HYDROcodone-acetaminophen (NORCO) 5-325 mg per tablet Take 1 tablet by mouth every 6 (six) hours as needed for Pain. 12 tablet 1/19/2023 -- Fany Reddy NP          Follow-up Information       Follow up With Specialties Details Why Contact Info Additional Information    [facility] [place] [facility]   [address] [facility] [address]  [phone]       [facility] Schedule an appointment as soon as possible for a visit   [address], [address]  [facility] [address]  [phone] [facility] - [facility], 4th Floor Please park in [address] and use [place] elevators             Fany Reddy, ANDI  01/19/23 9426

## 2023-11-30 ENCOUNTER — HOSPITAL ENCOUNTER (EMERGENCY)
Facility: OTHER | Age: 48
Discharge: HOME | End: 2023-12-01
Attending: EMERGENCY MEDICINE
Payer: MEDICAID

## 2023-11-30 DIAGNOSIS — T78.40XA ALLERGIC REACTION, INITIAL ENCOUNTER: ICD-10-CM

## 2023-11-30 DIAGNOSIS — K02.9 DENTAL CARIES: ICD-10-CM

## 2023-11-30 DIAGNOSIS — K12.0 APHTHOUS ULCER: Primary | ICD-10-CM

## 2023-11-30 LAB
ANION GAP SERPL CALC-SCNC: 8 MMOL/L (ref 8–16)
BASOPHILS # BLD AUTO: 0.05 K/UL (ref 0–0.2)
BASOPHILS NFR BLD: 0.7 % (ref 0–1.9)
BUN SERPL-MCNC: 9 MG/DL (ref 6–20)
CALCIUM SERPL-MCNC: 9.1 MG/DL (ref 8.7–10.5)
CHLORIDE SERPL-SCNC: 107 MMOL/L (ref 95–110)
CO2 SERPL-SCNC: 25 MMOL/L (ref 23–29)
CREAT SERPL-MCNC: 0.9 MG/DL (ref 0.5–1.4)
DIFFERENTIAL METHOD: ABNORMAL
EOSINOPHIL # BLD AUTO: 0.1 K/UL (ref 0–0.5)
EOSINOPHIL NFR BLD: 1.5 % (ref 0–8)
ERYTHROCYTE [DISTWIDTH] IN BLOOD BY AUTOMATED COUNT: 14.9 % (ref 11.5–14.5)
EST. GFR  (NO RACE VARIABLE): >60 ML/MIN/1.73 M^2
GLUCOSE SERPL-MCNC: 109 MG/DL (ref 70–110)
HCT VFR BLD AUTO: 38.9 % (ref 37–48.5)
HGB BLD-MCNC: 12.2 G/DL (ref 12–16)
IMM GRANULOCYTES # BLD AUTO: 0.02 K/UL (ref 0–0.04)
IMM GRANULOCYTES NFR BLD AUTO: 0.3 % (ref 0–0.5)
LYMPHOCYTES # BLD AUTO: 2.1 K/UL (ref 1–4.8)
LYMPHOCYTES NFR BLD: 31.1 % (ref 18–48)
MCH RBC QN AUTO: 30.2 PG (ref 27–31)
MCHC RBC AUTO-ENTMCNC: 31.4 G/DL (ref 32–36)
MCV RBC AUTO: 96 FL (ref 82–98)
MONOCYTES # BLD AUTO: 0.5 K/UL (ref 0.3–1)
MONOCYTES NFR BLD: 7.4 % (ref 4–15)
NEUTROPHILS # BLD AUTO: 4 K/UL (ref 1.8–7.7)
NEUTROPHILS NFR BLD: 59 % (ref 38–73)
NRBC BLD-RTO: 0 /100 WBC
PLATELET # BLD AUTO: 251 K/UL (ref 150–450)
PMV BLD AUTO: 9.6 FL (ref 9.2–12.9)
POTASSIUM SERPL-SCNC: 3.7 MMOL/L (ref 3.5–5.1)
RBC # BLD AUTO: 4.04 M/UL (ref 4–5.4)
SODIUM SERPL-SCNC: 140 MMOL/L (ref 136–145)
WBC # BLD AUTO: 6.79 K/UL (ref 3.9–12.7)

## 2023-11-30 PROCEDURE — 99285 EMERGENCY DEPT VISIT HI MDM: CPT | Mod: 25

## 2023-11-30 PROCEDURE — 96374 THER/PROPH/DIAG INJ IV PUSH: CPT

## 2023-11-30 PROCEDURE — 25500020 PHARM REV CODE 255: Performed by: EMERGENCY MEDICINE

## 2023-11-30 PROCEDURE — 63600175 PHARM REV CODE 636 W HCPCS: Performed by: EMERGENCY MEDICINE

## 2023-11-30 PROCEDURE — 96375 TX/PRO/DX INJ NEW DRUG ADDON: CPT

## 2023-11-30 PROCEDURE — 80048 BASIC METABOLIC PNL TOTAL CA: CPT | Performed by: EMERGENCY MEDICINE

## 2023-11-30 PROCEDURE — 85025 COMPLETE CBC W/AUTO DIFF WBC: CPT | Performed by: EMERGENCY MEDICINE

## 2023-11-30 PROCEDURE — 25000003 PHARM REV CODE 250: Performed by: EMERGENCY MEDICINE

## 2023-11-30 RX ORDER — FAMOTIDINE 10 MG/ML
20 INJECTION INTRAVENOUS
Status: COMPLETED | OUTPATIENT
Start: 2023-11-30 | End: 2023-11-30

## 2023-11-30 RX ORDER — METHYLPREDNISOLONE SOD SUCC 125 MG
125 VIAL (EA) INJECTION
Status: COMPLETED | OUTPATIENT
Start: 2023-11-30 | End: 2023-11-30

## 2023-11-30 RX ORDER — DIPHENHYDRAMINE HYDROCHLORIDE 50 MG/ML
25 INJECTION INTRAMUSCULAR; INTRAVENOUS
Status: COMPLETED | OUTPATIENT
Start: 2023-11-30 | End: 2023-11-30

## 2023-11-30 RX ORDER — IBUPROFEN 400 MG/1
400 TABLET ORAL
Status: COMPLETED | OUTPATIENT
Start: 2023-11-30 | End: 2023-11-30

## 2023-11-30 RX ADMIN — DIPHENHYDRAMINE HYDROCHLORIDE 25 MG: 50 INJECTION, SOLUTION INTRAMUSCULAR; INTRAVENOUS at 11:11

## 2023-11-30 RX ADMIN — IOHEXOL 100 ML: 350 INJECTION, SOLUTION INTRAVENOUS at 09:11

## 2023-11-30 RX ADMIN — METHYLPREDNISOLONE SODIUM SUCCINATE 125 MG: 125 INJECTION, POWDER, FOR SOLUTION INTRAMUSCULAR; INTRAVENOUS at 11:11

## 2023-11-30 RX ADMIN — FAMOTIDINE 20 MG: 10 INJECTION, SOLUTION INTRAVENOUS at 11:11

## 2023-11-30 RX ADMIN — IBUPROFEN 400 MG: 400 TABLET ORAL at 11:11

## 2023-12-01 VITALS
WEIGHT: 225 LBS | RESPIRATION RATE: 17 BRPM | OXYGEN SATURATION: 99 % | BODY MASS INDEX: 37.49 KG/M2 | SYSTOLIC BLOOD PRESSURE: 178 MMHG | DIASTOLIC BLOOD PRESSURE: 111 MMHG | TEMPERATURE: 99 F | HEART RATE: 68 BPM | HEIGHT: 65 IN

## 2023-12-01 RX ORDER — CLINDAMYCIN HYDROCHLORIDE 300 MG/1
300 CAPSULE ORAL EVERY 6 HOURS
Qty: 40 CAPSULE | Refills: 0 | Status: SHIPPED | OUTPATIENT
Start: 2023-12-01 | End: 2023-12-11

## 2023-12-01 NOTE — ED NOTES
Patient able to talk at this time and explain at this time, pt denies any SOB, however pt is having trouble talking t/r her tongue and mouth hurting.

## 2023-12-01 NOTE — ED PROVIDER NOTES
Encounter Date: 2023       History     Chief Complaint   Patient presents with    Facial Swelling     Pt states her gums started to become sore X 3 days ago.. then her throat started to become sore.. now her lips and gums are swelling. Pt states it feels like something is in the back of her throat. And she states it feels like her face is starting to swell.      27-year-old female past medical history of hypertension presents today with facial swelling.  Patient reports on Monday 3 days ago patient began having some pain and soreness to her upper and lower front teeth/lip.  She states yesterday her gums and lips began swelling and feels like it is progressed to her face and neck.  She also reports sore throat.  Denies any fevers or cough.  Patient reports pain with eating or drinking but she is still able to eat and drink. Denies any SOB or difficulty breathing. Denies any tongue swelling. Patient also reports she has a bad tooth in her left lower molar which she has scheduled to get pulled tomorrow by [facility] dental.  She also states she is been putting a cream on her face which is new over the past week.  She denies any drooling or voice changes.  Patient is on hydrochlorothiazide and amlodipine.    The history is provided by the patient. No  was used.     Review of patient's allergies indicates:  No Known Allergies  Past Medical History:   Diagnosis Date    Chronic back pain     Hypertension      Past Surgical History:   Procedure Laterality Date     SECTION      HYSTERECTOMY       No family history on file.  Social History     Tobacco Use    Smoking status: Former     Types: Cigarettes    Smokeless tobacco: Never   Substance Use Topics    Alcohol use: Yes     Comment: every day; 2 beers    Drug use: No     Types: Marijuana     Review of Systems    Physical Exam     Initial Vitals   BP Pulse Resp Temp SpO2   232 23   (!)  181/115 103 18 98.6 °F (37 °C) 98 %      MAP       --                Physical Exam    Nursing note and vitals reviewed.  Constitutional: She appears well-developed. She is not diaphoretic. No distress.   HENT:   Head: Normocephalic and atraumatic.   Right Ear: External ear normal.   Left Ear: External ear normal.   Nose: Nose normal.   Mouth/Throat: Oropharynx is clear and moist and mucous membranes are normal. She does not have dentures. Oral lesions present. No trismus in the jaw. Abnormal dentition. Dental caries present. No dental abscesses, uvula swelling or lacerations. No oropharyngeal exudate.   Aphthous ulcer to anterior inner lower lip  Uvula: without deviation or edema. Uvula midline.  Soft palate: without swelling  Sublingual: normal appearance/no brawny edema or tongue elevation  Teeth and gums: No periapical swelling or pus expression, no tooth fracture, no gingival swelling, no active oral bleeding.  Tonsils: without pus.  No stridor, tolerating secretions, normal phonation, no trismus     Eyes: Conjunctivae and EOM are normal. Pupils are equal, round, and reactive to light. No scleral icterus.   Neck: Neck supple. No thyromegaly present. No tracheal deviation present.   Normal range of motion.  Cardiovascular:  Normal rate, regular rhythm, normal heart sounds and intact distal pulses.     Exam reveals no gallop and no friction rub.       No murmur heard.  Pulmonary/Chest: Breath sounds normal. No stridor. No respiratory distress. She has no wheezes. She has no rhonchi. She has no rales.   Abdominal: Abdomen is soft. Bowel sounds are normal. She exhibits no distension. There is no abdominal tenderness. There is no rebound and no guarding.   Musculoskeletal:         General: Normal range of motion.      Cervical back: Normal range of motion and neck supple.     Lymphadenopathy:     She has no cervical adenopathy.   Neurological: She is alert and oriented to person, place, and time. She has normal  strength. No cranial nerve deficit or sensory deficit. GCS score is 15. GCS eye subscore is 4. GCS verbal subscore is 5. GCS motor subscore is 6.   Cranial nerves II through XII grossly intact. Finger to nose normal. Tone normal. Sens intact to light touch. No drift. No disdiadochokinesia. Strength 5/5 bilaterally upper and lower. Normal gait. No Romberg. Speech and cognition is normal.  No focal neurologic deficit.     Skin: Skin is warm and dry. Capillary refill takes less than 2 seconds. No rash noted.   Psychiatric: She has a normal mood and affect.         ED Course   Procedures  Labs Reviewed   CBC W/ AUTO DIFFERENTIAL - Abnormal; Notable for the following components:       Result Value    MCHC 31.4 (*)     RDW 14.9 (*)     All other components within normal limits   BASIC METABOLIC PANEL          Imaging Results              CT Soft Tissue Neck With Contrast (Final result)  Result time 11/30/23 22:29:41      Final result by Aimee Jaquez MD (11/30/23 22:29:41)                   Impression:      No acute abnormalities identified.      Electronically signed by: Aimee Jaquez MD  Date:    11/30/2023  Time:    22:29               Narrative:    EXAMINATION:  CT SOFT TISSUE NECK WITH CONTRAST    CLINICAL HISTORY:  Epiglottitis or tonsillitis suspected;Neck abscess, deep tissue;    TECHNIQUE:  Low dose axial images as well as sagittal and coronal reconstructions were performed from the skull base to the clavicles following the intravenous administration of 100 mL of Omnipaque 350.  Near complete images were obtained of the chest due to reported CT scanner malfunction.    COMPARISON:  None    FINDINGS:  No evidence of prevertebral soft tissue swelling.  Epiglottis is normal in thickness.  No peritonsillar or retropharyngeal fluid collection or abscess.  Thyroid is normal in appearance.  Submandibular glands and parotid glands are normal and symmetric in size.    Visualized portion of the brain shows no acute  abnormalities.  Orbits are normal in appearance.  Visualized paranasal sinuses and mastoid air cells are essentially clear.  No acute osseous abnormality, facial fracture, or cervical spine abnormalities identified.    Visualized lungs are clear.  Heart is upper limits of normal in size.  No abnormalities are seen along the esophageal course.  Partially visualized upper abdominal structures show no acute abnormalities.                                       Medications   iohexoL (OMNIPAQUE 350) injection 100 mL (100 mLs Intravenous Given 11/30/23 2140)   ibuprofen tablet 400 mg (400 mg Oral Given 11/30/23 2309)   methylPREDNISolone sodium succinate injection 125 mg (125 mg Intravenous Given 11/30/23 2334)   diphenhydrAMINE injection 25 mg (25 mg Intravenous Given 11/30/23 2333)   famotidine (PF) injection 20 mg (20 mg Intravenous Given 11/30/23 2336)     Medical Decision Making  No history of immunocompromise. Nontoxic appearance.  Patient euvolemic with no trismus and no airway compromise. Able to tolerate PO. Unlikely PTA, RPA, Ludwigs, epiglottitis, acute HIV, or EBV. Centor negative for strep.  No e/o tooth fracture, avulsion, or bleeding socket.  No e/o periapical abscess.  No e/o gingival hyperplasia or concern for drug reaction.  Patient has poor dentition with evidence of dental erosion.  Tenderness over left mucosa.  No obvious facial swelling.No fluctuance or induration.  No obvious abscess to drain.  No facial cellulitis.  No urticaria, respiratory distress. No stridor or drooling. Normal phonation.  Negative for respiratory symptoms such as wheezing, SOB/respiratory distress  Negative for cardiovascular signs such as hypotension  Negative for GI symptoms such as nausea and diarrhea  Low concern for cellulitis. Not anaphylactic. Unlikely drug reaction, toxic shock syndrome, contact dermatitis. Doubt angioedema.  CT neck ordered and negative for acute pathology.  Patient observed in the ER and had  improvement of symptoms after being given Benadryl, Pepcid and steroids.    Will prescribe antibiotics and pt plans to follow up with \[facility]\"" Dentistry tomorrow for her planned tooth extraction.  Disposition: Discharge home. Discussed return precautions for odontogenic infections and other dental pain emergencies. Will provide dental clinic list.      Amount and/or Complexity of Data Reviewed  Labs: ordered.  Radiology: ordered. Decision-making details documented in ED Course.    Risk  Prescription drug management.               ED Course as of 12/01/23 1217   u Nov 30, 2023 2242 CT Soft Tissue Neck With Contrast [BD]   2242 Impression:     No acute abnormalities identified.        Electronically signed by: Aimee Jaquez MD  Date:                                            11/30/2023  Time:                                           22:29   [BD]      ED Course User Index  [BD] Michael Baca MD               Medical Decision Making:   Differential Diagnosis:    Allergic reaction, Angioedema, anaphylaxis, PTA, RPA, Ludwigs, epiglottitis, acute HIV, or EBV               Clinical Impression:  Final diagnoses:  [K02.9] Dental caries  [K12.0] Aphthous ulcer (Primary)  [T78.40XA] Allergic reaction, initial encounter          ED Disposition Condition    Discharge Stable          ED Prescriptions       Medication Sig Dispense Start Date End Date Auth. Provider    clindamycin (CLEOCIN) 300 MG capsule Take 1 capsule (300 mg total) by mouth every 6 (six) hours. for 10 days 40 capsule 12/1/2023 12/11/2023 Michael Baca MD          Follow-up Information       Follow up With Specialties Details Why Contact Info    Center, \[facility]"" [facility] Go in 1 day  [address] [facility] [address]  [phone]      [facility] - Emergency Dept Emergency Medicine Go to  As needed, If symptoms worsen [address] [facility] [id number] [phone]             Michael Baca MD  12/01/23 8187